# Patient Record
Sex: FEMALE | URBAN - METROPOLITAN AREA
[De-identification: names, ages, dates, MRNs, and addresses within clinical notes are randomized per-mention and may not be internally consistent; named-entity substitution may affect disease eponyms.]

---

## 2017-03-27 ENCOUNTER — HOSPITAL ENCOUNTER (EMERGENCY)
Facility: CLINIC | Age: 32
End: 2017-03-27

## 2017-03-27 ENCOUNTER — TELEPHONE (OUTPATIENT)
Dept: PALLIATIVE MEDICINE | Facility: CLINIC | Age: 32
End: 2017-03-27

## 2017-03-27 NOTE — TELEPHONE ENCOUNTER
Call came in on 3/27/17 at 8:53 am today.      [facility] pharmacy calling to say they need Dr. Laura Payton's [place] ID #   Please call  [phone].  Patricia warner rn

## 2019-11-23 ENCOUNTER — HOSPITAL ENCOUNTER (EMERGENCY)
Age: 34
Discharge: HOME | End: 2019-11-23
Attending: EMERGENCY MEDICINE | Admitting: EMERGENCY MEDICINE

## 2019-11-23 ENCOUNTER — APPOINTMENT (OUTPATIENT)
Dept: CT IMAGING | Age: 34
End: 2019-11-23
Attending: EMERGENCY MEDICINE

## 2019-11-23 VITALS
WEIGHT: 170.42 LBS | SYSTOLIC BLOOD PRESSURE: 107 MMHG | HEART RATE: 91 BPM | OXYGEN SATURATION: 96 % | TEMPERATURE: 98.5 F | DIASTOLIC BLOOD PRESSURE: 76 MMHG | RESPIRATION RATE: 18 BRPM

## 2019-11-23 DIAGNOSIS — T71.194A STRANGULATION OR SUFFOCATION BY MEANS, UNDETERMINED WHETHER ACCIDENTALLY OR PURPOSELY INFLICTED, INITIAL ENCOUNTER: Primary | ICD-10-CM

## 2019-11-23 LAB
ANION GAP BLD CALC-SCNC: 13 MMOL/L
BUN BLD-MCNC: 10 MG/DL (ref 6–20)
CA-I BLD ISE-SCNC: 1.13 MMOL/L (ref 1.15–1.29)
CHLORIDE BLD-SCNC: 114 MMOL/L (ref 98–107)
CO2 BLD-SCNC: 20 MMOL/L (ref 19–24)
CREAT BLD-MCNC: 0.6 MG/DL (ref 0.51–0.95)
CREAT BLD-MCNC: >90 MG/DL
GLUCOSE BLD-MCNC: 104 MG/DL (ref 70–99)
HCT VFR BLD CALC: 41 % (ref 36–46.5)
HGB BLD-MCNC: 13.9 G/DL (ref 12–15.5)
POTASSIUM BLD-SCNC: 5.4 MMOL/L (ref 3.4–5.1)
SODIUM BLD-SCNC: 140 MMOL/L (ref 135–145)

## 2019-11-23 PROCEDURE — 36415 COLL VENOUS BLD VENIPUNCTURE: CPT

## 2019-11-23 PROCEDURE — 70496 CT ANGIOGRAPHY HEAD: CPT | Performed by: RADIOLOGY

## 2019-11-23 PROCEDURE — 70498 CT ANGIOGRAPHY NECK: CPT | Performed by: RADIOLOGY

## 2019-11-23 PROCEDURE — 10002807 HB RX 258: Performed by: EMERGENCY MEDICINE

## 2019-11-23 PROCEDURE — 70496 CT ANGIOGRAPHY HEAD: CPT

## 2019-11-23 PROCEDURE — 99284 EMERGENCY DEPT VISIT MOD MDM: CPT

## 2019-11-23 PROCEDURE — 85014 HEMATOCRIT: CPT

## 2019-11-23 PROCEDURE — 10002805 HB CONTRAST AGENT: Performed by: EMERGENCY MEDICINE

## 2019-11-23 PROCEDURE — 99283 EMERGENCY DEPT VISIT LOW MDM: CPT | Performed by: EMERGENCY MEDICINE

## 2019-11-23 RX ORDER — TIZANIDINE 2 MG/1
2 TABLET ORAL EVERY 6 HOURS PRN
COMMUNITY

## 2019-11-23 RX ORDER — VALACYCLOVIR HYDROCHLORIDE 1 G/1
TABLET, FILM COATED ORAL
COMMUNITY

## 2019-11-23 RX ORDER — NAPROXEN SODIUM 220 MG
220 TABLET ORAL 2 TIMES DAILY WITH MEALS
COMMUNITY

## 2019-11-23 RX ORDER — HYDROXYZINE HYDROCHLORIDE 25 MG/1
25 TABLET, FILM COATED ORAL 3 TIMES DAILY PRN
COMMUNITY

## 2019-11-23 RX ORDER — LORAZEPAM 1 MG/1
1 TABLET ORAL EVERY 6 HOURS PRN
COMMUNITY

## 2019-11-23 RX ORDER — CETIRIZINE HYDROCHLORIDE 10 MG/1
10 TABLET ORAL DAILY
COMMUNITY

## 2019-11-23 RX ORDER — VANCOMYCIN HYDROCHLORIDE 125 MG/1
125 CAPSULE ORAL 4 TIMES DAILY
COMMUNITY

## 2019-11-23 RX ORDER — TOPIRAMATE 100 MG/1
100 TABLET, FILM COATED ORAL 2 TIMES DAILY
COMMUNITY

## 2019-11-23 RX ORDER — OMEPRAZOLE 20 MG/1
20 CAPSULE, DELAYED RELEASE ORAL DAILY
COMMUNITY

## 2019-11-23 RX ADMIN — IOPAMIDOL 75 ML: 755 INJECTION, SOLUTION INTRAVENOUS at 21:59

## 2019-11-23 RX ADMIN — SODIUM CHLORIDE 50 ML: 9 INJECTION, SOLUTION INTRAVENOUS at 21:59
